# Patient Record
Sex: FEMALE | NOT HISPANIC OR LATINO | URBAN - NONMETROPOLITAN AREA
[De-identification: names, ages, dates, MRNs, and addresses within clinical notes are randomized per-mention and may not be internally consistent; named-entity substitution may affect disease eponyms.]

---

## 2017-06-06 ENCOUNTER — APPOINTMENT (RX ONLY)
Dept: URBAN - NONMETROPOLITAN AREA CLINIC 29 | Facility: CLINIC | Age: 67
Setting detail: DERMATOLOGY
End: 2017-06-06

## 2017-06-06 VITALS — HEIGHT: 65 IN | WEIGHT: 106 LBS

## 2017-06-06 DIAGNOSIS — D485 NEOPLASM OF UNCERTAIN BEHAVIOR OF SKIN: ICD-10-CM

## 2017-06-06 PROBLEM — D48.5 NEOPLASM OF UNCERTAIN BEHAVIOR OF SKIN: Status: ACTIVE | Noted: 2017-06-06

## 2017-06-06 PROCEDURE — 11100: CPT

## 2017-06-06 PROCEDURE — ? PATIENT SPECIFIC COUNSELING

## 2017-06-06 PROCEDURE — ? BIOPSY BY SHAVE METHOD

## 2017-06-06 ASSESSMENT — LOCATION SIMPLE DESCRIPTION DERM: LOCATION SIMPLE: RIGHT SHOULDER

## 2017-06-06 ASSESSMENT — LOCATION ZONE DERM: LOCATION ZONE: ARM

## 2017-06-06 ASSESSMENT — LOCATION DETAILED DESCRIPTION DERM: LOCATION DETAILED: RIGHT POSTERIOR SHOULDER

## 2017-06-06 NOTE — PROCEDURE: PATIENT SPECIFIC COUNSELING
Recommend keeping area covered to decrease risk of infection and risk of sensitivity from wind, sun, or other environmental irritants.  \\n\\nI appreciate the input from Dr. Panda regarding biopsy of this lesion and the input from Dr. Richards regarding analgesia.  Will discuss pain control with patient's oncologist and will defer to them for further analgesia recommendations.
Detail Level: Zone

## 2017-06-06 NOTE — PROCEDURE: BIOPSY BY SHAVE METHOD
Hemostasis: TCA 20%
Size Of Lesion In Cm: 0
Detail Level: Detailed
Destruction After The Procedure: No
Dressing: bandage
Biopsy Type: H and E
Curettage Text: The wound bed was treated with curettage after the biopsy was performed.
Silver Nitrate Text: The wound bed was treated with silver nitrate after the biopsy was performed.
Consent: Written consent was obtained and risks were reviewed including but not limited to scarring, infection, bleeding, scabbing, incomplete removal, nerve damage and allergy to anesthesia.
Wound Care: Aquaphor
Type Of Destruction Used: Curettage
Post-Care Instructions: I reviewed with the patient in detail post-care instructions. Patient is to keep the biopsy site dry overnight, and then apply bacitracin twice daily until healed. Patient may apply hydrogen peroxide soaks to remove any crusting.
Biopsy Method: Dermablade
Electrodesiccation And Curettage Text: The wound bed was treated with electrodesiccation and curettage after the biopsy was performed.
Anesthesia Volume In Cc: 0.3
Anesthesia Type: 1% lidocaine with epinephrine
Electrodesiccation Text: The wound bed was treated with electrodesiccation after the biopsy was performed.
Cryotherapy Text: The wound bed was treated with cryotherapy after the biopsy was performed.
Notification Instructions: Patient will be notified of biopsy results. However, patient instructed to call the office if not contacted within 2 weeks.
Billing Type: Third-Party Bill

## 2017-06-21 ENCOUNTER — APPOINTMENT (RX ONLY)
Dept: URBAN - NONMETROPOLITAN AREA CLINIC 29 | Facility: CLINIC | Age: 67
Setting detail: DERMATOLOGY
End: 2017-06-21

## 2017-06-21 PROBLEM — C44.91 BASAL CELL CARCINOMA OF SKIN, UNSPECIFIED: Status: ACTIVE | Noted: 2017-06-21

## 2017-06-21 PROCEDURE — ? PATIENT SPECIFIC COUNSELING

## 2017-06-21 PROCEDURE — 99212 OFFICE O/P EST SF 10 MIN: CPT

## 2017-06-21 NOTE — PROCEDURE: MIPS QUALITY
Quality 226: Preventive Care And Screening: Tobacco Use: Screening And Cessation Intervention: Patient screened for tobacco and is an ex-smoker
Detail Level: Detailed
Quality 110: Preventive Care And Screening: Influenza Immunization: Influenza Immunization not Administered for Documented Reasons.
Quality 265: Biopsy Follow-Up: Biopsy results reviewed, communicated, tracked, and documented
Quality 111:Pneumonia Vaccination Status For Older Adults: Pneumococcal Vaccination not Administered or Previously Received, Reason not Otherwise Specified
Quality 431: Preventive Care And Screening: Unhealthy Alcohol Use - Screening: Patient screened for unhealthy alcohol use using a single question and scores less than 2 times per year
Quality 130: Documentation Of Current Medications In The Medical Record: Current Medications Documented

## 2017-06-21 NOTE — PROCEDURE: PATIENT SPECIFIC COUNSELING
Discussed case with Maya Goldman PA-C. After Lady's initial appointment KK spoke with patient's oncologist Dr. Carreon. She has hepatic cancer that has an overall poor prognosis. She is currently undergoing chemotherapy for hepatic cancer. He felt that when she is through with this course of chemo she could pursue either XRT or excision, depending on her preference.\\n\\nDiscussed dx of basal cell carcinoma and the associated prognosis. Discussed minimal risk of metastasis to lymph nodes, although in the rare cases that it does happen it is typically from large, longstanding lesions like this one. But still unlikely overall. Discussed treatment options. Patient would strongly prefer excision, and wants to pursue this option. Definitely doesn't want XRT. Discussed referral to general surgery at [facility]. They can see her in consultation now, and set up the surgery when she is finished with chemo and Hem/Onc approves. Will set up referral to Dr. Loza.
Detail Level: Simple

## 2018-12-13 ENCOUNTER — APPOINTMENT (RX ONLY)
Dept: URBAN - NONMETROPOLITAN AREA CLINIC 27 | Facility: CLINIC | Age: 68
Setting detail: DERMATOLOGY
End: 2018-12-13